# Patient Record
Sex: MALE | Race: WHITE | NOT HISPANIC OR LATINO | Employment: UNEMPLOYED | URBAN - METROPOLITAN AREA
[De-identification: names, ages, dates, MRNs, and addresses within clinical notes are randomized per-mention and may not be internally consistent; named-entity substitution may affect disease eponyms.]

---
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
## 2024-03-18 NOTE — LETTER
February 12, 2023         Patient: Vince Clifford   YOB: 2005   Date of Visit: 2/12/2023           To Whom it May Concern:    Vince Clifford was seen in my clinic on 2/2/2023, 2/9/2023, and 2/12/2023 due to illness. Due to medical necessity, please excuse patient from work 2/6/2023 through 2/10/2023.       If you have any questions or concerns, please don't hesitate to call.        Sincerely,         JEMIMA Corado.  Electronically Signed      Denise